# Patient Record
Sex: FEMALE | Race: WHITE
[De-identification: names, ages, dates, MRNs, and addresses within clinical notes are randomized per-mention and may not be internally consistent; named-entity substitution may affect disease eponyms.]

---

## 2022-02-03 ENCOUNTER — DASHBOARD ENCOUNTERS (OUTPATIENT)
Age: 52
End: 2022-02-03

## 2022-02-04 ENCOUNTER — WEB ENCOUNTER (OUTPATIENT)
Dept: URBAN - METROPOLITAN AREA CLINIC 92 | Facility: CLINIC | Age: 52
End: 2022-02-04

## 2022-02-04 ENCOUNTER — OFFICE VISIT (OUTPATIENT)
Dept: URBAN - METROPOLITAN AREA CLINIC 92 | Facility: CLINIC | Age: 52
End: 2022-02-04
Payer: COMMERCIAL

## 2022-02-04 ENCOUNTER — TELEPHONE ENCOUNTER (OUTPATIENT)
Dept: URBAN - METROPOLITAN AREA CLINIC 92 | Facility: CLINIC | Age: 52
End: 2022-02-04

## 2022-02-04 ENCOUNTER — LAB OUTSIDE AN ENCOUNTER (OUTPATIENT)
Dept: URBAN - METROPOLITAN AREA CLINIC 92 | Facility: CLINIC | Age: 52
End: 2022-02-04

## 2022-02-04 VITALS
HEIGHT: 62 IN | DIASTOLIC BLOOD PRESSURE: 82 MMHG | HEART RATE: 81 BPM | SYSTOLIC BLOOD PRESSURE: 128 MMHG | BODY MASS INDEX: 30 KG/M2 | WEIGHT: 163 LBS | TEMPERATURE: 97.1 F

## 2022-02-04 DIAGNOSIS — R10.11 RUQ PAIN: ICD-10-CM

## 2022-02-04 DIAGNOSIS — Z12.11 COLON CANCER SCREENING: ICD-10-CM

## 2022-02-04 PROCEDURE — 99244 OFF/OP CNSLTJ NEW/EST MOD 40: CPT | Performed by: INTERNAL MEDICINE

## 2022-02-04 PROCEDURE — 99204 OFFICE O/P NEW MOD 45 MIN: CPT | Performed by: INTERNAL MEDICINE

## 2022-02-04 RX ORDER — POLYETHYLENE GLYCOL 3350, SODIUM SULFATE ANHYDROUS, SODIUM BICARBONATE, SODIUM CHLORIDE, POTASSIUM CHLORIDE 236; 22.74; 6.74; 5.86; 2.97 G/4L; G/4L; G/4L; G/4L; G/4L
AS DIRECTED POWDER, FOR SOLUTION ORAL
Qty: 1 BOTTLE | OUTPATIENT
Start: 2022-02-04

## 2022-02-04 NOTE — HPI-TODAY'S VISIT:
51-year-old female with no significant past medical history who presents to discuss episodic abdominal pain.  Has had 2 episodes of this lifelong, the first being 1 month ago, persisted for 2 days.  Describes sharp, knifelike pain in her right upper quadrant, worse while laying down.  Worst pain of her life.  Most recent episode occurred recently, had to go to the emergency room after 4 days of pain.  Eating was tolerable, but worsened the pain, no problem after switching to a BRAT diet.  The pain was associated with vomiting after 4 days.  Additionally her stools turned light and hull colored, no diarrhea, may be some constipation.  Also noted darkening of her urine, appeared brown.  In the emergency room ([facility]) underwent right upper quadrant ultrasound, CT AP, blood work, urinalysis; all reportedly normal aside from some constipation.  Since then she is still aware of the pain, but it is not bothering her.  The pain did start after a heavy Mexican meal.  Denies any fevers or chills.  Has never had a colonoscopy, no fam hx of CRC.   Patient referred by Dr. Pena, and a copy of my note will be sent to him.

## 2022-03-31 ENCOUNTER — OFFICE VISIT (OUTPATIENT)
Dept: URBAN - METROPOLITAN AREA SURGERY CENTER 16 | Facility: SURGERY CENTER | Age: 52
End: 2022-03-31